# Patient Record
Sex: FEMALE | Race: BLACK OR AFRICAN AMERICAN | NOT HISPANIC OR LATINO | Employment: OTHER | URBAN - METROPOLITAN AREA
[De-identification: names, ages, dates, MRNs, and addresses within clinical notes are randomized per-mention and may not be internally consistent; named-entity substitution may affect disease eponyms.]

---

## 2024-05-02 ENCOUNTER — APPOINTMENT (OUTPATIENT)
Dept: OBSTETRICS AND GYNECOLOGY | Facility: CLINIC | Age: 19
End: 2024-05-02
Payer: MEDICARE

## 2024-05-06 ENCOUNTER — LAB (OUTPATIENT)
Dept: LAB | Facility: LAB | Age: 19
End: 2024-05-06

## 2024-05-06 ENCOUNTER — ROUTINE PRENATAL (OUTPATIENT)
Dept: OBSTETRICS AND GYNECOLOGY | Facility: CLINIC | Age: 19
End: 2024-05-06

## 2024-05-06 VITALS — DIASTOLIC BLOOD PRESSURE: 72 MMHG | WEIGHT: 137 LBS | SYSTOLIC BLOOD PRESSURE: 114 MMHG | BODY MASS INDEX: 24.27 KG/M2

## 2024-05-06 DIAGNOSIS — Z34.01 ENCOUNTER FOR SUPERVISION OF NORMAL FIRST PREGNANCY, FIRST TRIMESTER (HHS-HCC): ICD-10-CM

## 2024-05-06 DIAGNOSIS — Z32.01 PREGNANCY TEST POSITIVE (HHS-HCC): Primary | ICD-10-CM

## 2024-05-06 LAB — PREGNANCY TEST URINE, POC: POSITIVE

## 2024-05-06 PROCEDURE — 83020 HEMOGLOBIN ELECTROPHORESIS: CPT

## 2024-05-06 PROCEDURE — 87800 DETECT AGNT MULT DNA DIREC: CPT

## 2024-05-06 PROCEDURE — 86803 HEPATITIS C AB TEST: CPT

## 2024-05-06 PROCEDURE — 81025 URINE PREGNANCY TEST: CPT

## 2024-05-06 PROCEDURE — 83021 HEMOGLOBIN CHROMOTOGRAPHY: CPT

## 2024-05-06 PROCEDURE — 87591 N.GONORRHOEAE DNA AMP PROB: CPT

## 2024-05-06 PROCEDURE — 86317 IMMUNOASSAY INFECTIOUS AGENT: CPT

## 2024-05-06 PROCEDURE — 87340 HEPATITIS B SURFACE AG IA: CPT

## 2024-05-06 PROCEDURE — 36415 COLL VENOUS BLD VENIPUNCTURE: CPT

## 2024-05-06 PROCEDURE — 0501F PRENATAL FLOW SHEET: CPT

## 2024-05-06 PROCEDURE — 86780 TREPONEMA PALLIDUM: CPT

## 2024-05-06 PROCEDURE — 86787 VARICELLA-ZOSTER ANTIBODY: CPT

## 2024-05-06 PROCEDURE — 87491 CHLMYD TRACH DNA AMP PROBE: CPT

## 2024-05-06 PROCEDURE — 87086 URINE CULTURE/COLONY COUNT: CPT

## 2024-05-06 PROCEDURE — 87389 HIV-1 AG W/HIV-1&-2 AB AG IA: CPT

## 2024-05-06 RX ORDER — NAPROXEN SODIUM 220 MG/1
162 TABLET, FILM COATED ORAL DAILY
Qty: 60 TABLET | Refills: 2 | Status: SHIPPED | OUTPATIENT
Start: 2024-05-06 | End: 2024-08-04

## 2024-05-06 ASSESSMENT — EDINBURGH POSTNATAL DEPRESSION SCALE (EPDS)
I HAVE BLAMED MYSELF UNNECESSARILY WHEN THINGS WENT WRONG: NO, NEVER
I HAVE BEEN ABLE TO LAUGH AND SEE THE FUNNY SIDE OF THINGS: AS MUCH AS I ALWAYS COULD
I HAVE BEEN ANXIOUS OR WORRIED FOR NO GOOD REASON: YES, SOMETIMES
TOTAL SCORE: 6
THINGS HAVE BEEN GETTING ON TOP OF ME: YES, SOMETIMES I HAVEN'T BEEN COPING AS WELL AS USUAL
I HAVE BEEN SO UNHAPPY THAT I HAVE BEEN CRYING: ONLY OCCASIONALLY
I HAVE FELT SCARED OR PANICKY FOR NO GOOD REASON: NO, NOT AT ALL
I HAVE BEEN SO UNHAPPY THAT I HAVE HAD DIFFICULTY SLEEPING: NOT AT ALL
I HAVE FELT SAD OR MISERABLE: NOT VERY OFTEN
I HAVE LOOKED FORWARD WITH ENJOYMENT TO THINGS: AS MUCH AS I EVER DID
THE THOUGHT OF HARMING MYSELF HAS OCCURRED TO ME: NEVER

## 2024-05-06 NOTE — PROGRESS NOTES
Assessment   Diagnoses and all orders for this visit:  Pregnancy test positive ([facility])  -     POCT pregnancy, urine manually resulted  Encounter for supervision of normal first pregnancy, first trimester ([facility])  -     prenatal vitamin, iron-folic, 27 mg iron-800 mcg folic acid tablet; Take 1 tablet by mouth once daily.  -     aspirin 81 mg chewable tablet; Chew 2 tablets (162 mg) once daily.  -     US MAC OB imaging order; Future  -     CBC Anemia Panel With Reflex,Pregnancy; Future  -     HEMOGLOBIN IDENTIFICATION WITH PATH REVIEW; Future  -     Hepatitis B surface antigen; Future  -     Hepatitis C antibody; Future  -     Rubella Antibody, IgG; Future  -     Syphilis Screen with Reflex; Future  -     HIV 1/2 Antigen/Antibody Screen with Reflex to Confirmation; Future  -     Varicella Zoster Antibody, IgG; Future  -     Urine Culture  -     C. Trachomatis / N. Gonorrhoeae, Amplified Detection    Plan   - New OB resources provided and reviewed with particular attention to dietary, travel, and medication restrictions  - Oriented to practice, CNM vs. MD care  - Reviewed bleeding precautions, warning signs, when to call provider; phone number provided  - Discussed bASA for PEC prophylaxis, patient verbalizes understanding.   - The following Rx were sent to pharmacy: PNV, bASA  - Routine NOB labs ordered  - Discussed Centering Pregnancy with patient, declined at this time  - Dating ultrasound ordered  - Discuss genetic screening at next visit after dating confirmed  - Return in 4 weeks for routine prenatal care    RENÉ Carnes-GUMEM    Subjective   Timothy HORN Jose Ramon is a 18 y.o.  at 16wk4d with a working estimated date of delivery of 10/19/24. who presents for an initial prenatal visit. This pregnancy is unplanned, patient is overall doing well with pregnancy so far.     Patient currently experiencing: nausea, resolved after the first trimester.   Bleeding or cramping since LMP: no  Taking prenatal  vitamin: No  Ultrasound completed this pregnancy: No  Last pap: NA    Review of Systems   All other systems reviewed and are negative.     OB History    Para Term  AB Living   1             SAB IAB Ectopic Multiple Live Births                  # Outcome Date GA Lbr Jarek/2nd Weight Sex Delivery Anes PTL Lv   1 Current              [place]  Depression Scale Total: 6  History reviewed. No pertinent past medical history.   History reviewed. No pertinent surgical history.   Social History     Socioeconomic History    Marital status: Single     Spouse name: None    Number of children: None    Years of education: None    Highest education level: None   Occupational History    None   Tobacco Use    Smoking status: Never    Smokeless tobacco: Never   Substance and Sexual Activity    Alcohol use: Never    Drug use: Never    Sexual activity: None   Other Topics Concern    None   Social History Narrative    None     Social Determinants of Health     Financial Resource Strain: Not on file   Food Insecurity: Not on file   Transportation Needs: Not on file   Physical Activity: Not on file   Stress: Not on file   Social Connections: Not on file   Intimate Partner Violence: Not on file        Objective   Physical Exam  Weight: 62.1 kg (137 lb)  TW kg (0 lb)   Expected Total Weight Gain: 11.5 kg (25 lb)-16 kg (35 lb)   Pregravid BMI: 24.27  BP: 114/72    General:   Alert and oriented x 3   Heart:  Thyroid: Regular rate, rhythm  Euthyroid, normal shape and size   Lungs:  Breast: Clear to auscultation bilaterally  Symmetrical, no skin changes/nipple discharge, redness, tenderness, no masses palpated bilaterally   Abdomen: Soft, non tender   Vulva: EGBUS normal   Vagina: Pink, normal discharge   Cervix: No CMT   Uterus: Gravid, 16 weeks, um/2   Adnexa: NT bilaterally     Postpartum Depression: Medium Risk (2024)    [place]  Depression Scale     Last EPDS Total Score: 6     Last EPDS Self Harm  Result: Never        Pregnancy Problems (from 05/06/24 to present)       No problems associated with this episode.           Important in pregnancy    Avoidance of alcohol, tobacco and drug use   Dietary restrictions reviewed including avoiding raw or poorly cooked meat, lunch meat and soft cheeses  3.    Adequate water intake.  Avoid empty calories with juices  4.    Recommendation for weight gain based on initial BMI (body mass index)  5.    Limit caffeine to less than 200-300 mg/day  6.    Take folic acid 400 mcg daily.  Incorporate 5,000u Vitamin D3 per day.  Discuss Magnesium Supplementation  7.    Importance of good sleep hygiene and avoidance of laying on back after 15 weeks  8.    Encourage daily physical activity of 30 minutes a day the majority of the days of the week  9.    Discussed normal physiologic changes:  Round ligament pain, nausea, breast tenderness  10.  Discussed natural remedies, vitamins and prescription medications for nausea  11.  Baby aspirin 162mg daily (two baby aspirin) for the reduction of pre-eclampsia during pregnancy.  Even if you have          never had any blood pressure issues, you can develop hypertension during your pregnancy.  This has been well          Studied and safe to take starting at 12 weeks gestation until after the birth of your baby.     **IF AT ANYTIME DURING YOUR PREGNANCY YOU HAVE CONCERNS THAT YOU CANNOT AFFORD HEALTHY FOOD PLEASE LET US KNOW!**  We have a Food for Life program and would be happy to place a referral for you.  It is so important to eat healthy during your pregnancy and we treat food as medicine.  Healthy food is expensive!  This program will allow you and your family up to 4 to receive food and recipes for one week per month.  This needs to be renewed every 6 months.     Ultrasound and screening for aneuploidies (Down Syndrome/Trisomy 21, Trisomy 13 + 18)  A requisition has been placed for a dating ultrasound.  Please call to get that scheduled.     At this ultrasound they will ask you if your would like to proceed with screening for genetic disorders that are listed above.  They will do that with an ultrasound at approximately 13 weeks and we also draw blood work for screening which includes the fetal sex if you desire to know.     Ultrasound for anatomy will be done at 19 weeks.  Based on risk factors and any concerns the maternal fetal medicine provider has, you may need a repeat ultrasound.  Healthy pregnancies that do not have any other concerns by the midwife or maternal fetal medicine do not have any repeat ultrasounds done.     Labs:   An order will be placed for your new ob labs.  Please get those done at the time of your ultrasound.  They will collect          multiple tubes of blood for new ob labs and also urine for STI testing and a urine culture.   If there are any concerns with any blood work or urine testing WE WILL CALL YOU OR COMMUNICATE VIA          [employer]!!!   The biggest concerns our patients have is when they see their complete blood count.  The reference          range in the result is for a non pregnant person!  We will notify you if there is any need to start an iron supplement.  3.    At 26-28 weeks a glucose test is ordered to see if you have gestational diabetes.  We also reassess if you have          Anemia, which can be common in pregnancy  4.    Group B strep culture will be done at 36 weeks gestation.     We also recommend that you be screened once in your life for Cystic Fibrosis and Spinal Muscular Atrophy.  This assesses if we need your partner to be tested if you are a carrier of a gene that can be passed along to your baby.  For this to happen, both parents must be a carrier to the gene.     Choices for care and hospital for birth:  I am a Certified Nurse Midwife and practice in a group setting, which means that any of the midwives in my group practice may be there for your birth.  We care for healthy females during  "pregnancy and labor/birth.  We practice in collaboration with physicians within our group.  If there are any concerns with your pregnancy, labor or birth our physicians work closely with us.       The midwives in our practice strongly encourage you to explore the option of Centering Pregnancy which has been studied for better birth outcomes!  Care will be done in a group setting with 1:1 time with a midwife and then in depth education about every stage of your pregnancy, labor/birth,  care, feeding choices, pediatrician options, birth control and coping techniques for the first few weeks after birth.  We have day groups and our [place] location and a Monday evening group at [facility].  The groups follow your normal prenatal schedule and yes, we keep in contact and I see you at the end of your pregnancy.     There are certain medical conditions that \"risks you out\" of midwifery care that we are constantly assessing for.  Some conditions during your pregnancy that would risk you out of midwifery care would be:   Severe growth restriction of your baby   Labor/Birth  less than 35 weeks   Severe pre-eclampsia at any time during your pregnancy/labor/birth   Gestational Diabetes needing medication (insulin) to control your blood sugars   If you decline or do not complete your glucose testing to rule out diet controlled diabetes by 32 weeks   If you are diagnosed with chronic hypertension during your pregnancy and need to start medication     The options for birth where we provide 24/7 Certified Nurse Midwifery coverage with a board certified OB/GYN, in house anesthesia and neonataology coverage are:     [facility] at [place], [place]     To call for questions regarding your care of if you are in labor is [phone]  My nurses name is Lidia Miner who can answer questions and keep me updated should any questions or " "concerns arise during your pregnancy.     After hours, the answering service will ask you where you intended to give birth and connect you to the midwife on call at [facility] or the [facility].     If you would like to tour either facility:  Please call the Childbirth education line at [phone]     Danger signs to report:  Seek medical care immediately if you have pain that is doubling you over or vaginal bleeding that is heavier than a  period  Notify the office should you have any burning, urgency, frequency of urination or other concerning symptoms.     Medications that are safe for common discomforts of pregnancy:   Tylenol   Tums or Papaya extract for any upset stomach or heartburn   Zyrtec, Claritin, Benadryl for allergy symptoms   Sudafed or Robitussin for cold symptoms       Work restrictions:  A normal healthy pregnancy without any complications are able to have the standard pregnancy work restrictions which is no pushing/pulling/lifting greater than 25 pounds independently     FMLA paperwork  Can be brought to the office for us to fill out for when you are starting your maternity leave (either your scheduled date of going to the hospital or your due date).  We cannot give out early FMLA when there is no documented medical conditions that are considered \"normal pregnancy\" events.     Comfort measures   Chiropractors are great for alleviation of ligament pain   Yoga is good for your ligaments and mentally preparing for baby and labor.  A prenatal yoga class is recommended.  3.     Prenatal massages are fine  4.     A maternity support belt is an amazing thing that can help ligament pain -- can be purchased on Amazon  5.     Good supportive shoes are key to helping with ligament pain     Dental care  It is very important to see a dentist during your pregnancy for routine cleaning and also if you develop any dental pain during your pregnancy.  Healthy gums and teeth are very " important during your pregnancy.  We can provide you with a dental letter if your dentist would like one.     Thank you for choosing our practice and [facility] for you healthcare!  I am excited to partner with you during this very special time!      If there is anything I can do to help make your experience is positive, please come to your visits with questions and concerns and do not be afraid to ask what is on your mind!  We will see you in the office every 4 weeks until you are 30 weeks, then every 2 weeks until 36 weeks and then weekly until your baby is born.

## 2024-05-07 DIAGNOSIS — A74.9 CHLAMYDIA: Primary | ICD-10-CM

## 2024-05-07 LAB
BACTERIA UR CULT: ABNORMAL
C TRACH RRNA SPEC QL NAA+PROBE: POSITIVE
HBV SURFACE AG SERPL QL IA: NONREACTIVE
HCV AB SER QL: NONREACTIVE
HIV 1+2 AB+HIV1 P24 AG SERPL QL IA: NONREACTIVE
N GONORRHOEA DNA SPEC QL PROBE+SIG AMP: NEGATIVE
RUBV IGG SERPL IA-ACNC: 1.2 IA
RUBV IGG SERPL QL IA: POSITIVE
TREPONEMA PALLIDUM IGG+IGM AB [PRESENCE] IN SERUM OR PLASMA BY IMMUNOASSAY: NONREACTIVE
VARICELLA ZOSTER IGG INDEX: 4.4 IA
VZV IGG SER QL IA: POSITIVE

## 2024-05-07 RX ORDER — AZITHROMYCIN 500 MG/1
1000 TABLET, FILM COATED ORAL ONCE
Qty: 2 TABLET | Refills: 0 | Status: SHIPPED | OUTPATIENT
Start: 2024-05-07 | End: 2024-05-07

## 2024-05-08 ENCOUNTER — TELEPHONE (OUTPATIENT)
Dept: OBSTETRICS AND GYNECOLOGY | Facility: CLINIC | Age: 19
End: 2024-05-08
Payer: MEDICARE

## 2024-05-08 LAB
HEMOGLOBIN A2: 2.7 % (ref 2–3.5)
HEMOGLOBIN A: 97.2 % (ref 95.8–98)
HEMOGLOBIN F: 0.1 % (ref 0–2)
HEMOGLOBIN IDENTIFICATION INTERPRETATION: NORMAL
PATH REVIEW-HGB IDENTIFICATION: NORMAL

## 2024-05-08 NOTE — TELEPHONE ENCOUNTER
Called and notified Pt that she has been diagnosed with a sexually transmitted infection.    An antibiotic has been ordered for pt.   EPT can be offered for up to two partners within the past 60 days. Offered EPT and pt will send info via [employer].  Advised to take antibiotics as soon as possible and encourage partner to do the same.   Please refrain from sex for at least 7 days after both her and partner are treated.   Please use condoms to prevent STIs.  Pt verbalized understanding.

## 2024-06-03 ENCOUNTER — APPOINTMENT (OUTPATIENT)
Dept: OBSTETRICS AND GYNECOLOGY | Facility: CLINIC | Age: 19
End: 2024-06-03
Payer: MEDICARE

## 2024-08-14 ENCOUNTER — APPOINTMENT (OUTPATIENT)
Dept: OBSTETRICS AND GYNECOLOGY | Facility: CLINIC | Age: 19
End: 2024-08-14
Payer: MEDICARE

## 2024-09-18 ENCOUNTER — APPOINTMENT (OUTPATIENT)
Dept: OBSTETRICS AND GYNECOLOGY | Facility: CLINIC | Age: 19
End: 2024-09-18
Payer: MEDICARE

## 2024-09-18 DIAGNOSIS — Z30.46 ENCOUNTER FOR NEXPLANON REMOVAL: Primary | ICD-10-CM

## 2024-09-18 DIAGNOSIS — Z11.3 SCREEN FOR STD (SEXUALLY TRANSMITTED DISEASE): ICD-10-CM

## 2024-09-18 DIAGNOSIS — Z30.016 ENCOUNTER FOR INITIAL PRESCRIPTION OF TRANSDERMAL PATCH HORMONAL CONTRACEPTIVE DEVICE: ICD-10-CM

## 2024-09-18 PROCEDURE — 11982 REMOVE DRUG IMPLANT DEVICE: CPT | Performed by: STUDENT IN AN ORGANIZED HEALTH CARE EDUCATION/TRAINING PROGRAM

## 2024-09-18 PROCEDURE — 99214 OFFICE O/P EST MOD 30 MIN: CPT | Performed by: STUDENT IN AN ORGANIZED HEALTH CARE EDUCATION/TRAINING PROGRAM

## 2024-09-18 RX ORDER — LIDOCAINE HCL/EPINEPHRINE/PF 2%-1:200K
0.5 VIAL (ML) INJECTION ONCE
Status: COMPLETED | OUTPATIENT
Start: 2024-09-18 | End: 2024-09-18

## 2024-09-18 RX ORDER — NORELGESTROMIN AND ETHINYL ESTRADIOL 35; 150 UG/MG; UG/MG
1 PATCH TRANSDERMAL WEEKLY
Qty: 3 PATCH | Refills: 11 | Status: SHIPPED | OUTPATIENT
Start: 2024-09-18 | End: 2025-09-18

## 2024-09-18 ASSESSMENT — PATIENT HEALTH QUESTIONNAIRE - PHQ9
2. FEELING DOWN, DEPRESSED OR HOPELESS: NOT AT ALL
1. LITTLE INTEREST OR PLEASURE IN DOING THINGS: NOT AT ALL
SUM OF ALL RESPONSES TO PHQ9 QUESTIONS 1 AND 2: 0

## 2024-09-18 ASSESSMENT — ENCOUNTER SYMPTOMS
PSYCHIATRIC NEGATIVE: 0
CARDIOVASCULAR NEGATIVE: 0
ALLERGIC/IMMUNOLOGIC NEGATIVE: 0
MUSCULOSKELETAL NEGATIVE: 0
HEMATOLOGIC/LYMPHATIC NEGATIVE: 0
ENDOCRINE NEGATIVE: 0
EYES NEGATIVE: 0
CONSTITUTIONAL NEGATIVE: 0
NEUROLOGICAL NEGATIVE: 0
RESPIRATORY NEGATIVE: 0
GASTROINTESTINAL NEGATIVE: 0

## 2024-09-18 NOTE — PROGRESS NOTES
Subjective   Patient ID: Timothy Albright is a 19 y.o. female who presents for Nexplanon Removal.  Patient here for Nexplanon removal.  Patient also here for discussion of alternative contraceptive options.  Patient is requesting STI testing.        Review of Systems   All other systems reviewed and are negative.      Objective   Physical Exam  Vitals reviewed.   Constitutional:       General: She is not in acute distress.     Appearance: She is not ill-appearing.   Pulmonary:      Effort: Pulmonary effort is normal.   Skin:     Coloration: Skin is not pale.   Neurological:      Mental Status: She is alert.         Assessment/Plan   Diagnoses and all orders for this visit:  Encounter for Nexplanon removal  -     Insertion of Contraceptive Capsule  -     lidocaine-epinephrine PF (Xylocaine W/EPI) 2 %-1:200,000 injection 0.5 mL  Encounter for initial prescription of transdermal patch hormonal contraceptive device  -     norelgestromin-ethin.estradioL (Ortho-Evra) 150-35 mcg/24 hr; Place 1 patch on the skin 1 (one) time per week. Apply 1 patch to the skin weekly for 3 weeks.  Then off for 1 week.  Screen for STD (sexually transmitted disease)  -     Trichomonas vaginalis, Nucleic Acid Detection  -     C. Trachomatis / N. Gonorrhoeae, Amplified Detection    Uncomplicated Nexplanon removal as below.  (Order for insertion of contraceptive counseling capsules placed in error).  On all of her contraceptive options patient desires contraceptive patch.  Patient is without contraindication to estradiol.  Patient also is requesting STI screening so orders placed.  Patient to follow-up at her convenience for Pap smear.       Waylon Duke MD 09/18/24 5:32 PM     Patient ID: Timothy Albright is a 19 y.o. female.    Insertion of Contraceptive Capsule    Date/Time: 9/18/2024 5:27 PM    Performed by: Waylon Duke MD  Authorized by: Waylon Duke MD    Consent:     Consent obtained:  Verbal and written     Consent given by:  Patient    Procedural risks discussed:  Bleeding, damage to other organs, infection and failure rate    Patient questions answered: yes      Patient agrees, verbalizes understanding, and wants to proceed: yes    Indication:     Indication: Presence of non-biodegradable drug delivery implant    Pre-procedure:     Prepped with: povidone-iodine      Local anesthetic:  Lidocaine with epinephrine    The site was cleaned and prepped in a sterile fashion: yes    Procedure:     Procedure:  Removal    Small stab incision was made in arm: yes      Left/right:  Left    Visualization of implant was obtained: yes      Site was closed with steri-strips and pressure bandage applied: yes    Comments:      Uncomplicated Nexplanon REMOVAL

## 2024-09-18 NOTE — ADDENDUM NOTE
Addended by: THOMAS LANDON on: 9/18/2024 05:34 PM     Modules accepted: Orders, Level of Service

## 2024-10-01 ENCOUNTER — APPOINTMENT (OUTPATIENT)
Dept: OBSTETRICS AND GYNECOLOGY | Facility: CLINIC | Age: 19
End: 2024-10-01
Payer: MEDICARE

## 2025-01-06 ENCOUNTER — APPOINTMENT (OUTPATIENT)
Dept: OBSTETRICS AND GYNECOLOGY | Facility: CLINIC | Age: 20
End: 2025-01-06